# Patient Record
Sex: FEMALE | Race: WHITE | NOT HISPANIC OR LATINO | URBAN - METROPOLITAN AREA
[De-identification: names, ages, dates, MRNs, and addresses within clinical notes are randomized per-mention and may not be internally consistent; named-entity substitution may affect disease eponyms.]

---

## 2017-09-30 ENCOUNTER — EMERGENCY (EMERGENCY)
Facility: HOSPITAL | Age: 22
LOS: 1 days | Discharge: HOME | End: 2017-09-30
Attending: EMERGENCY MEDICINE | Admitting: EMERGENCY MEDICINE
Payer: COMMERCIAL

## 2017-09-30 VITALS
OXYGEN SATURATION: 100 % | SYSTOLIC BLOOD PRESSURE: 125 MMHG | TEMPERATURE: 99 F | HEART RATE: 81 BPM | DIASTOLIC BLOOD PRESSURE: 82 MMHG | RESPIRATION RATE: 18 BRPM

## 2017-09-30 VITALS
HEART RATE: 83 BPM | OXYGEN SATURATION: 100 % | TEMPERATURE: 98 F | SYSTOLIC BLOOD PRESSURE: 122 MMHG | DIASTOLIC BLOOD PRESSURE: 79 MMHG | RESPIRATION RATE: 18 BRPM

## 2017-09-30 DIAGNOSIS — F10.120 ALCOHOL ABUSE WITH INTOXICATION, UNCOMPLICATED: ICD-10-CM

## 2017-09-30 DIAGNOSIS — S80.211A ABRASION, RIGHT KNEE, INITIAL ENCOUNTER: ICD-10-CM

## 2017-09-30 LAB — ETHANOL SERPL-MCNC: 295 MG/DL — HIGH

## 2017-09-30 PROCEDURE — 99284 EMERGENCY DEPT VISIT MOD MDM: CPT

## 2017-09-30 RX ORDER — SODIUM CHLORIDE 9 MG/ML
1000 INJECTION INTRAMUSCULAR; INTRAVENOUS; SUBCUTANEOUS ONCE
Qty: 0 | Refills: 0 | Status: COMPLETED | OUTPATIENT
Start: 2017-09-30 | End: 2017-09-30

## 2017-09-30 RX ORDER — ONDANSETRON 8 MG/1
4 TABLET, FILM COATED ORAL ONCE
Qty: 0 | Refills: 0 | Status: COMPLETED | OUTPATIENT
Start: 2017-09-30 | End: 2017-09-30

## 2017-09-30 RX ADMIN — ONDANSETRON 4 MILLIGRAM(S): 8 TABLET, FILM COATED ORAL at 20:49

## 2017-09-30 RX ADMIN — SODIUM CHLORIDE 1000 MILLILITER(S): 9 INJECTION INTRAMUSCULAR; INTRAVENOUS; SUBCUTANEOUS at 20:49

## 2017-09-30 NOTE — ED PROVIDER NOTE - MEDICAL DECISION MAKING DETAILS
Patient here with apparent isolated EtOH intoxication with nausea and vomiting. Will check EtOH level, hydrate with NS and give Zofran.  Will observe until more awake, alert, steady and with a safe plan for d/c.

## 2017-09-30 NOTE — ED PROVIDER NOTE - OBJECTIVE STATEMENT
22 F BIB FIT RA for Severe EtOH intox and n/v. She was drinking ++. Appears to have skinned her knee. No other apparent injuries. Denies drugs or PMHx.

## 2017-09-30 NOTE — ED ADULT NURSE NOTE - OBJECTIVE STATEMENT
arousable to verbal stimuli, admits to drinking a lot of alcohol, denies any injury trauma, +n/v noted.  at bedside. no s/s of any injury no c/o pain

## 2017-09-30 NOTE — ED PROVIDER NOTE - CARE PLAN
Principal Discharge DX:	Alcoholic intoxication without complication  Secondary Diagnosis:	Nausea and vomiting, intractability of vomiting not specified, unspecified vomiting type

## 2017-09-30 NOTE — ED PROVIDER NOTE - PROGRESS NOTE DETAILS
Patient is awake, alert and steady for d/c. Counselled on heavy EtOH. L cheek is slightly red. Minor abrasions. No tammie ttp. RA will take her home.

## 2018-10-30 NOTE — ED ADULT NURSE NOTE - FALLEN IN THE PAST
[[email or web address]] : Pt is here for a blood pressure check. Pt states she has been under a lot of stress and has been getting headaches. For the past few weeks she has been very busy caring for her mother and it has been stressful and she has been experiencing headaches.  Prior to that she was eating healthy and has lost 30 pounds.  She has been taking the irbesartan daily.  She has been checking her BP at home with a wrist monitor and the diastolic reading has been in the 90 range.  She was given xanax years ago and still has quite a few pills left in the bottle and is asking if she can take it when she really feels stressed. no